# Patient Record
Sex: MALE | Race: WHITE
[De-identification: names, ages, dates, MRNs, and addresses within clinical notes are randomized per-mention and may not be internally consistent; named-entity substitution may affect disease eponyms.]

---

## 2019-06-22 ENCOUNTER — HOSPITAL ENCOUNTER (EMERGENCY)
Dept: HOSPITAL 41 - JD.ED | Age: 64
Discharge: NURSING, REHAB OR LONG-TERM CARE | End: 2019-06-22
Payer: SELF-PAY

## 2019-06-22 DIAGNOSIS — Z79.899: ICD-10-CM

## 2019-06-22 DIAGNOSIS — E66.9: ICD-10-CM

## 2019-06-22 DIAGNOSIS — F17.219: ICD-10-CM

## 2019-06-22 DIAGNOSIS — E78.00: ICD-10-CM

## 2019-06-22 DIAGNOSIS — G45.9: Primary | ICD-10-CM

## 2019-06-22 DIAGNOSIS — Z88.0: ICD-10-CM

## 2019-06-22 DIAGNOSIS — I10: ICD-10-CM

## 2019-06-22 PROCEDURE — 80053 COMPREHEN METABOLIC PANEL: CPT

## 2019-06-22 PROCEDURE — 85610 PROTHROMBIN TIME: CPT

## 2019-06-22 PROCEDURE — 70450 CT HEAD/BRAIN W/O DYE: CPT

## 2019-06-22 PROCEDURE — 96374 THER/PROPH/DIAG INJ IV PUSH: CPT

## 2019-06-22 PROCEDURE — 84484 ASSAY OF TROPONIN QUANT: CPT

## 2019-06-22 PROCEDURE — 93005 ELECTROCARDIOGRAM TRACING: CPT

## 2019-06-22 PROCEDURE — 99285 EMERGENCY DEPT VISIT HI MDM: CPT

## 2019-06-22 PROCEDURE — 82962 GLUCOSE BLOOD TEST: CPT

## 2019-06-22 PROCEDURE — 85025 COMPLETE CBC W/AUTO DIFF WBC: CPT

## 2019-06-22 PROCEDURE — 36415 COLL VENOUS BLD VENIPUNCTURE: CPT

## 2019-06-22 NOTE — EDM.PDOC
ED HPI GENERAL MEDICAL PROBLEM





- General


Chief Complaint: Neuro Symptoms/Deficits


Stated Complaint: RIGHT SIDE NUMBNES IN ARM AND LEG


Time Seen by Provider: 06/22/19 21:53


Source of Information: Reports: Patient, Family


History Limitations: Reports: No Limitations





- History of Present Illness


INITIAL COMMENTS - FREE TEXT/NARRATIVE: 





This is a 64-year-old male. Tonight while he was driving to visit his son about 

1.5 hours ago he noted that his right arm became somewhat clumsy and didn't 

seem to do what he wanted to do. When he got to his son's house and got out of 

the truck he noted that he was dragging his right leg though it wasn't 

completely paralyzed. The son brought him here to the ER for evaluation. When 

he arrived he was moving all 4 extremities. He complained of some numbness in 

his arms and legs but now he says there is no numbness. He does not appear to 

be in great distress. He has no facial drooping and no pronator drift. He is 

able to hold his right leg up appropriately without noted weakness. He has no 

history of CVA in the past. He does have a history of hypertension, obesity, 

hypercholesterolemia and smoking. No cardiac history. His blood sugar in the ER 

was 131.





- Related Data


 Allergies











Allergy/AdvReac Type Severity Reaction Status Date / Time


 


Penicillins Allergy  Cannot Verified 06/22/19 22:00





   Remember  











Home Meds: 


 Home Meds





Irbesartan 300 mg PO DAILY 06/22/19 [History]


atorvaSTATin [Lipitor] 20 mg PO DAILY 06/22/19 [History]











ED ROS GENERAL





- Review of Systems


Review Of Systems: See Below


Constitutional: Denies: Fever, Chills


HEENT: Reports: No Symptoms


Respiratory: Denies: Shortness of Breath, Cough


Cardiovascular: Denies: Chest Pain


GI/Abdominal: Reports: No Symptoms


: Reports: No Symptoms


Musculoskeletal: Reports: Other (As per history of present illness)


Skin: Reports: No Symptoms


Neurological: Reports: Other (As per history of present illness)


Psychiatric: Reports: No Symptoms


Hematologic/Lymphatic: Reports: No Symptoms





ED EXAM, NEURO





- Physical Exam


Exam: See Below


Exam Limited By: No Limitations


General Appearance: Alert, WD/WN, No Apparent Distress


Eye Exam: Bilateral Eye: Normal Inspection, Other (He has no facial drooping)


Ears: Normal External Exam, Normal Canal, Normal TMs


Nose: Normal Inspection


Throat/Mouth: Normal Inspection, Normal Lips, Normal Voice, No Airway Compromise

, Other (No slurred speech)


Head Exam: Normocephalic


Neck: Supple


Respiratory/Chest: No Respiratory Distress, Lungs Clear, Normal Breath Sounds


Cardiovascular: Regular Rate, Rhythm, No Murmur, Bradycardia, Other (Does have 

a faint 1/6 systolic ejection murmur noted, seems to translate into the carotids

)


GI/Abdominal: Soft, Non-Tender


Neurological: Alert, Normal Mood/Affect, CN II-XII Intact, Oriented x 3, Other (

He is able to do finger to nose and straight leg raising and negative pronator 

drift now but he says he is much better than he was prior to coming to the ER, 

NIH score initially was 0)


Back Exam: Normal Inspection


Extremities: Normal Inspection, Normal Range of Motion, Other (As above)


Psychiatric: Normal Affect, Normal Mood


Skin Exam: Warm, Dry





EKG INTERPRETATION


EKG Date: 06/22/19


Time: 22:05


EKG Interpretation Comments: 





EKG shows a sinus bradycardia rate of 53 he does not appear to have any acute 

ST or T-wave changes or elevation and there is no ischemia noted.





Course





- Vital Signs


Last Recorded V/S: 


 Last Vital Signs











Temp  96.8 F   06/22/19 21:54


 


Pulse  57 L  06/22/19 21:54


 


Resp  15   06/22/19 21:54


 


BP  209/73 H  06/22/19 21:54


 


Pulse Ox  97   06/22/19 21:54














- Orders/Labs/Meds


Orders: 


 Active Orders 24 hr











 Category Date Time Status


 


 EKG 12 Lead [EKG Documentation Completion] [RC] STAT Care  06/22/19 22:00 

Active


 


 Head wo Cont [CT] Stat Exams  06/22/19 22:00 Taken











Labs: 


 Laboratory Tests











  06/22/19 06/22/19 06/22/19 Range/Units





  21:57 21:58 21:58 


 


WBC   10.58 H   (4.23-9.07)  K/mm3


 


RBC   5.97   (4.63-6.08)  M/mm3


 


Hgb   14.6   (13.7-17.5)  gm/L


 


Hct   45.5   (40.1-51.0)  %


 


MCV   76.2 L   (79.0-92.2)  fl


 


MCH   24.5 L   (25.7-32.2)  pg


 


MCHC   32.1 L   (32.2-35.5)  g/dl


 


RDW Std Deviation   44.6 H   (35.1-43.9)  fL


 


Plt Count   252   (163-337)  K/mm3


 


MPV   10.5   (9.4-12.3)  fl


 


Neut % (Auto)   49.2   (34.0-67.9)  %


 


Lymph % (Auto)   37.0   (21.8-53.1)  %


 


Mono % (Auto)   9.1   (5.3-12.2)  %


 


Eos % (Auto)   3.9   (0.8-7.0)  


 


Baso % (Auto)   0.6   (0.1-1.2)  %


 


Neut # (Auto)   5.22   (1.78-5.38)  K/mm3


 


Lymph # (Auto)   3.91 H   (1.32-3.57)  K/mm3


 


Mono # (Auto)   0.96 H   (0.30-0.82)  K/mm3


 


Eos # (Auto)   0.41   (0.04-0.54)  K/mm3


 


Baso # (Auto)   0.06   (0.01-0.08)  K/mm3


 


PT    10.3  (9.5-12.1)  SECONDS


 


INR    0.94  


 


Sodium     (136-145)  mEq/L


 


Potassium     (3.5-5.1)  mEq/L


 


Chloride     ()  mEq/L


 


Carbon Dioxide     (21-32)  mEq/L


 


Anion Gap     (5-15)  


 


BUN     (7-18)  mg/dL


 


Creatinine     (0.7-1.3)  mg/dL


 


Est Cr Clr Drug Dosing     mL/min


 


Estimated GFR (MDRD)     (>60)  mL/min


 


BUN/Creatinine Ratio     (14-18)  


 


Glucose     ()  mg/dL


 


POC Glucose  131 H    ()  mg/dL


 


Calcium     (8.5-10.1)  mg/dL


 


Total Bilirubin     (0.2-1.0)  mg/dL


 


AST     (15-37)  U/L


 


ALT     (16-63)  U/L


 


Alkaline Phosphatase     ()  U/L


 


Troponin I     (0.00-0.056)  ng/mL


 


Total Protein     (6.4-8.2)  g/dl


 


Albumin     (3.4-5.0)  g/dl


 


Globulin     gm/dL


 


Albumin/Globulin Ratio     (1-2)  














  06/22/19 Range/Units





  21:58 


 


WBC   (4.23-9.07)  K/mm3


 


RBC   (4.63-6.08)  M/mm3


 


Hgb   (13.7-17.5)  gm/L


 


Hct   (40.1-51.0)  %


 


MCV   (79.0-92.2)  fl


 


MCH   (25.7-32.2)  pg


 


MCHC   (32.2-35.5)  g/dl


 


RDW Std Deviation   (35.1-43.9)  fL


 


Plt Count   (163-337)  K/mm3


 


MPV   (9.4-12.3)  fl


 


Neut % (Auto)   (34.0-67.9)  %


 


Lymph % (Auto)   (21.8-53.1)  %


 


Mono % (Auto)   (5.3-12.2)  %


 


Eos % (Auto)   (0.8-7.0)  


 


Baso % (Auto)   (0.1-1.2)  %


 


Neut # (Auto)   (1.78-5.38)  K/mm3


 


Lymph # (Auto)   (1.32-3.57)  K/mm3


 


Mono # (Auto)   (0.30-0.82)  K/mm3


 


Eos # (Auto)   (0.04-0.54)  K/mm3


 


Baso # (Auto)   (0.01-0.08)  K/mm3


 


PT   (9.5-12.1)  SECONDS


 


INR   


 


Sodium  142  (136-145)  mEq/L


 


Potassium  3.9  (3.5-5.1)  mEq/L


 


Chloride  107  ()  mEq/L


 


Carbon Dioxide  23  (21-32)  mEq/L


 


Anion Gap  15.9 H  (5-15)  


 


BUN  20 H  (7-18)  mg/dL


 


Creatinine  1.7 H  (0.7-1.3)  mg/dL


 


Est Cr Clr Drug Dosing  47.99  mL/min


 


Estimated GFR (MDRD)  41  (>60)  mL/min


 


BUN/Creatinine Ratio  11.8 L  (14-18)  


 


Glucose  143 H  ()  mg/dL


 


POC Glucose   ()  mg/dL


 


Calcium  8.9  (8.5-10.1)  mg/dL


 


Total Bilirubin  0.2  (0.2-1.0)  mg/dL


 


AST  20  (15-37)  U/L


 


ALT  27  (16-63)  U/L


 


Alkaline Phosphatase  80  ()  U/L


 


Troponin I  0.044  (0.00-0.056)  ng/mL


 


Total Protein  7.3  (6.4-8.2)  g/dl


 


Albumin  3.2 L  (3.4-5.0)  g/dl


 


Globulin  4.1  gm/dL


 


Albumin/Globulin Ratio  0.8 L  (1-2)  











Meds: 


Medications














Discontinued Medications














Generic Name Dose Route Start Last Admin





  Trade Name Niles  PRN Reason Stop Dose Admin


 


Aspirin  324 mg  06/22/19 22:44  06/22/19 22:49





  Aspirin  PO  06/22/19 22:45  324 mg





  ONETIME ONE   Administration





     





     





     





     


 


Hydralazine HCl  10 mg  06/22/19 22:16  06/22/19 22:20





  Apresoline  IVPUSH  06/22/19 22:17  10 mg





  ONETIME ONE   Administration





     





     





     





     


 


Labetalol HCl  10 mg  06/22/19 22:12  





  Normodyne  IVPUSH  06/22/19 22:13  





  ONETIME ONE   





     





     





  Protocol   





     














- Radiology Interpretation


Free Text/Narrative:: 





CT scan of the head did not show any hemorrhagic stroke and I do not see any 

ischemic stroke at this time.





- Re-Assessments/Exams


Free Text/Narrative Re-Assessment/Exam: 





06/22/19 22:22


Patient states he is moving better and feeling better.


06/22/19 22:49


Called [facility] in [place] and spoke to Dr. Mera the neurologist. 

She feels like it's appropriate for him to come to [place] for further workup 

of the TIA. I also spoke to the emergency room physician to notify them in case 

he redevelops his symptoms on the way to [place] and also spoke to Dr. Khalil, 

hospitalist, who accepts the patient in transport as a direct admit to [facility] and [place].





Patient still remains stable with no reoccurrence of symptoms of weakness or 

clumsiness in the right upper and right lower extremity. He is able to sign his 

name normally and he is right handed.


06/22/19 22:58


Please note that when he first arrived his blood pressure was 209/89 and after 

the CT scan it was 177/70 so we touched him a little bit with 10 of hydralazine 

dropped to 148/68. We will leave it at this point. He did not give him 

labetalol since his pulse normally runs in the upper 40s low 50s.


06/22/19 23:11


The patient is remained stable during his stay in the ER and he had no further 

reoccurrence of his symptoms of right-sided weakness or clumsiness prior to 

leaving the ER. I did speak to the ambulance crew that if his symptoms change 

and he developed some neurological changes they're to immediately call the ER 

at [facility] and go to the ER for further therapy otherwise he'll 

go to the floor as a direct admit.





Departure





- Departure


Time of Disposition: 22:51


Disposition: DC/Tfer to Acute Hospital [facility]


Condition: Good


Clinical Impression: 


 Transient ischemic attack (TIA), Right sided weakness, Hypercholesterolemia, 

Obesity (BMI 30-39.9)





Hypertension


Qualifiers:


 Hypertension type: essential hypertension Qualified Code(s): I10 - Essential (

primary) hypertension





Nicotine dependence


Qualifiers:


 Nicotine product type: cigarettes Substance use status: unspecified nicotine-

induced disorder Qualified Code(s): F17.219 - Nicotine dependence, cigarettes, 

with unspecified nicotine-induced disorders








- Discharge Information


Referrals: 


PCP,Not In Area [Primary Care Provider] - 





ED Communication





- ED Communication Date/Time


Date: 06/22/19


Time Called: 22:52





- Discussed Case With (1)


Discussed Case With (1): Admitting Provider


Person/s Notified (1): Dr. Khalil (She agrees to accept the patient in 

transport to [facility])





- My Orders


Last 24 Hours: 


My Active Orders





06/22/19 22:00


EKG 12 Lead [EKG Documentation Completion] [RC] STAT 


Head wo Cont [CT] Stat 














- Assessment/Plan


Last 24 Hours: 


My Active Orders





06/22/19 22:00


EKG 12 Lead [EKG Documentation Completion] [RC] STAT 


Head wo Cont [CT] Stat

## 2019-06-24 NOTE — CT
Head CT

 

Technique: Multiple axial sections through the brain were obtained.  

Intravenous contrast was not utilized.

 

Comparison: No previous intracranial imaging.

 

Findings: Ventricles along with basal cisterns and sulci over the 

convexities are within normal limits for the patient's age.  No 

abnormal parenchymal densities are seen.  No evidence of intracranial 

hemorrhage.  No midline shift or mass effect is seen.

 

Bone window settings were reviewed which show no acute calvarial 

abnormality.  Slight mucosal thickening is partially visualized within

 the left maxillary sinus.  No acute paranasal sinus findings are 

seen.  Mastoid sinuses are clear.

 

Impression:

1.  Sinus finding believed to be incidental.

2.  No acute intracranial abnormality is appreciated.  If patient's 

symptoms warrant further evaluation, MRI could then be considered.

 

Diagnostic code #2

 

I agree with preliminary report from [facility], finalized on 06/22/19, 

11:11 PM Central Time